# Patient Record
Sex: FEMALE | Race: AMERICAN INDIAN OR ALASKA NATIVE
[De-identification: names, ages, dates, MRNs, and addresses within clinical notes are randomized per-mention and may not be internally consistent; named-entity substitution may affect disease eponyms.]

---
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
## 2021-03-18 NOTE — EMERGENCY DEPARTMENT REPORT
ED General Adult HPI





- General


Chief complaint: Sore Throat


Stated complaint: SORETHROAT


Time Seen by Provider: 03/18/21 08:26


Source: patient


Mode of arrival: Ambulatory


Limitations: No Limitations





- History of Present Illness


Initial comments: 


This is a 20-year-old female with a severe sore throat for 4 days.  She is 

unable to tolerate p.o.  She has had no respiratory symptoms and no difficulty 

with phonation.  She does feel weak.  She is not giving an extensive history at 

this time.  However, she denies prior medical problems.





Patient presents with a temperature of 103.1 blood pressure 132/68 and a heart 

rate of 156 which is persistent at the time of my encounter.





-: days(s)


Location: mouth


Severity scale (0 -10): 5


Quality: aching


Consistency: constant


Improves with: none


Worsens with: none


Associated Symptoms: denies other symptoms (Poorly responsive to review but 

denies cough chest pain or respiratory symptoms.)





- Related Data


                                    Allergies











Allergy/AdvReac Type Severity Reaction Status Date / Time


 


No Known Allergies Allergy   Verified 03/18/21 08:03














ED Review of Systems


ROS: 


Stated complaint: SORETHROAT


Other details as noted in HPI





Constitutional: weakness.  denies: chills, fever


Eyes: denies: eye pain, eye discharge, vision change


ENT: throat pain.  denies: ear pain


Respiratory: denies: cough, shortness of breath, wheezing


Cardiovascular: denies: chest pain, palpitations


Endocrine: no symptoms reported


Gastrointestinal: denies: abdominal pain, nausea, diarrhea


Genitourinary: denies: urgency, dysuria, discharge


Musculoskeletal: denies: back pain, joint swelling, arthralgia


Skin: denies: rash, lesions


Neurological: denies: headache, weakness, paresthesias


Psychiatric: denies: anxiety, depression


Hematological/Lymphatic: denies: easy bleeding, easy bruising





ED Past Medical Hx





- Past Medical History


Previous Medical History?: No





- Surgical History


Past Surgical History?: No





- Social History


Smoking Status: Never Smoker


Substance Use Type: Alcohol





ED Physical Exam





- General


Limitations: Other (Poor cooperation)


General appearance: alert, in no apparent distress





- Head


Head exam: Present: atraumatic, normocephalic





- Eye


Eye exam: Present: normal appearance





- ENT


ENT exam: Present: mucous membranes moist, other (Exudative pharyngitis.  No 

evidence of uvular deviation or peritonsillar abscess)





- Neck


Neck exam: Present: normal inspection, lymphadenopathy, other (No neck swelling 

no fluctuant nodes there are bilateral submid mandibular nodes that are somewhat

tender but certainly not fluctuant)





- Respiratory


Respiratory exam: Present: normal lung sounds bilaterally.  Absent: respiratory 

distress





- Cardiovascular


Cardiovascular Exam: Present: normal rhythm, tachycardia.  Absent: systolic 

murmur, diastolic murmur, rubs, gallop





- GI/Abdominal


GI/Abdominal exam: Present: soft, normal bowel sounds.  Absent: distended, 

tenderness, guarding, rebound





- Extremities Exam


Extremities exam: Present: normal inspection





- Back Exam


Back exam: Present: normal inspection





- Neurological Exam


Neurological exam: Present: alert, oriented X3, CN II-XII intact.  Absent: motor

sensory deficit





- Psychiatric


Psychiatric exam: Present: normal affect, anxious





- Skin


Skin exam: Present: warm, dry, intact, normal color.  Absent: rash





ED Course


                                   Vital Signs











  03/18/21 03/18/21 03/18/21





  08:03 09:01 09:14


 


Temperature 103.1 F H  


 


Pulse Rate 156 H 120 H 


 


Respiratory 24 28 H 16





Rate   


 


Blood Pressure 132/68 125/75 


 


Blood Pressure   





[Left]   


 


O2 Sat by Pulse 100 100 





Oximetry   














  03/18/21 03/18/21 03/18/21





  09:15 09:16 09:31


 


Temperature   


 


Pulse Rate 129 H  111 H


 


Respiratory 16 18 19





Rate   


 


Blood Pressure 131/84  131/84


 


Blood Pressure   





[Left]   


 


O2 Sat by Pulse 98  99





Oximetry   














  03/18/21 03/18/21





  09:34 10:00


 


Temperature  100.3 F H


 


Pulse Rate  130 H


 


Respiratory 20 18





Rate  


 


Blood Pressure  


 


Blood Pressure  132/86





[Left]  


 


O2 Sat by Pulse 100 97





Oximetry  














- Reevaluation(s)


Reevaluation #1: 


Patient was given antipyretics and IV fluids.  Despite this her heart rate has 

remained quite elevated 120s to 160s.  She states she feels somewhat better.  I 

have given her clindamycin and decided to add ceftriaxone.  Cultures are pendin

g.  Strep screen is pending.  Lactic acid level was hemolyzed and will be 

redrawn.  The patient was found to have a positive pregnancy test.  She stated 

that her last period was on time and 2/17/2021.





Case was related to Dr. Herrera, hospitalist.  Consultation is pending.


03/18/21 10:42





Reevaluation #2: 


Discussed again with Dr. Paul.  She states to admit to Dr. Kocherla.


03/18/21 10:49








ED Medical Decision Making





- Lab Data


Result diagrams: 


                                 03/18/21 08:46





                                 03/18/21 08:46








                         Laboratory Results - last 24 hr











  03/18/21 03/18/21 03/18/21





  08:46 08:46 08:46


 


WBC  18.1 H  


 


RBC  4.18  


 


Hgb  13.0  


 


Hct  38.7  


 


MCV  93  


 


MCH  31  


 


MCHC  34  


 


RDW  12.9 L  


 


Plt Count  234  


 


Lymph % (Auto)  4.7 L  


 


Mono % (Auto)  7.6 H  


 


Eos % (Auto)  0.0  


 


Baso % (Auto)  0.4  


 


Lymph # (Auto)  0.8 L  


 


Mono # (Auto)  1.4 H  


 


Eos # (Auto)  0.0  


 


Baso # (Auto)  0.1  


 


Seg Neutrophils %  87.3 H  


 


Seg Neutrophils #  15.8 H  


 


PT   14.3 


 


INR   1.12 


 


APTT   31.7 


 


VBG pH   


 


Sodium   


 


Potassium   


 


Chloride   


 


Carbon Dioxide   


 


Anion Gap   


 


BUN   


 


Creatinine   


 


Estimated GFR   


 


BUN/Creatinine Ratio   


 


Glucose   


 


Calcium   


 


Magnesium   


 


Total Bilirubin   


 


Direct Bilirubin   


 


Indirect Bilirubin   


 


AST   


 


ALT   


 


Alkaline Phosphatase   


 


Total Protein   


 


Albumin   


 


Albumin/Globulin Ratio   


 


HCG, Qual    Positive


 


Monoscreen    Negative














  03/18/21 03/18/21 03/18/21





  08:46 08:46 08:46


 


WBC   


 


RBC   


 


Hgb   


 


Hct   


 


MCV   


 


MCH   


 


MCHC   


 


RDW   


 


Plt Count   


 


Lymph % (Auto)   


 


Mono % (Auto)   


 


Eos % (Auto)   


 


Baso % (Auto)   


 


Lymph # (Auto)   


 


Mono # (Auto)   


 


Eos # (Auto)   


 


Baso # (Auto)   


 


Seg Neutrophils %   


 


Seg Neutrophils #   


 


PT   


 


INR   


 


APTT   


 


VBG pH  7.437 H  


 


Sodium   136 L 


 


Potassium   3.5 L 


 


Chloride   100.5 


 


Carbon Dioxide   23 


 


Anion Gap   16 


 


BUN   7 


 


Creatinine   0.8 


 


Estimated GFR   > 60 


 


BUN/Creatinine Ratio   9 


 


Glucose   89 


 


Calcium   9.2 


 


Magnesium    1.90


 


Total Bilirubin   0.90 


 


Direct Bilirubin   0.2 


 


Indirect Bilirubin   0.7 


 


AST   13 


 


ALT   7 


 


Alkaline Phosphatase   58 


 


Total Protein   7.5 


 


Albumin   3.9 


 


Albumin/Globulin Ratio   1.1 


 


HCG, Qual   


 


Monoscreen   














- EKG Data


-: EKG Interpreted by Me


EKG shows normal: sinus rhythm, axis, intervals, QRS complexes, ST-T waves


Rate: normal, tachycardia





- EKG Data


Interpretation: no acute changes


Critical care attestation.: 


If time is entered above; I have spent that time in minutes in the direct care 

of this critically ill patient, excluding procedure time.








ED Disposition


Clinical Impression: 


 Exudative pharyngitis, Unable to eat, SIRS (systemic inflammatory response 

syndrome)





Pregnancy


Qualifiers:


 Weeks of gestation: less than 8 weeks Qualified Code(s): Z3A.01 - Less than 8 

weeks gestation of pregnancy





Disposition: DC-01 TO HOME OR SELFCARE


Is pt being admited?: Yes


Does the pt Need Aspirin: No


Condition: Stable


Referrals: 


[facility], MD [Primary Care Provider] - 3-5 Days


Time of Disposition: 10:57

## 2021-03-18 NOTE — CONSULTATION
History of Present Illness





- Reason for Consult


Consult date: 03/18/21





- History of Present Illness





20-year-old female no known past medical history presents complaining of sore 

throat.  She notes began 4 days previously and has been progressively worse over

that time.  She has been unable to tolerate p.o. food or drink.  She is a poor 

historian.  She is able to speak without issue however.





Febrile to 103.1 with a heart rate 156.  White count 18.1 strep and mono 

negative.  Beta hCG positive.  Blood cultures no growth to date.





Imaging personally reviewed:


None obtained so far.





Review of Systems: Bold if positive, otherwise negative


General: fevers, chills, rigors


HEENT: visual disturbance, diplopia, eye pain, +throat pain


Respiratory: cough, sputum, hemoptysis, shortness of breath


Cardiovascular: chest pain, syncope


Gastrointestinal: nausea, vomiting, diarrhea, abdominal pain


Genitourinary: dysuria, hematuria, flank pain


Musculoskeletal: neck pain, back pain, joint pain, edema 


Neurologic: headaches, seizures


Hematologic: easy bruising or bleeding


Endocrine: night sweats, acute weight loss


Skin: rash, jaundice, redness


Psychiatric: suicidal, homicidal ideation





Past History


Past Medical History: No medical history


Past Surgical History: No surgical history


Social history: no significant social history


Family history: no significant family history





Medications and Allergies


                                    Allergies











Allergy/AdvReac Type Severity Reaction Status Date / Time


 


No Known Allergies Allergy   Verified 03/18/21 08:03











Active Meds: 


Active Medications





Sodium Chloride (Nacl 0.9% 1000 Ml)  1,000 mls @ 75 mls/hr IV AS DIRECT SHARON











Physical Examination





- Physical Exam


Narrative exam: 





Physical Exam: 


Constitutional: Alert, cooperative. No acute distress


Head, Ears, Nose: Normocephalic, atraumatic. External ears, nose normal


Eyes: Conjunctivae/corneas clear. No icterus. No ptosis.


Neck: Supple, no meningeal signs


Oral: dentition fair, no thrush


Cardiovascular: S1, S2 normal. 


Respiratory: Good air entry, clear to auscultation bilaterally


GI: Soft, non-tender; bowel sounds normal. No peritoneal signs. 


Musculoskeletal: No pedal edema, no cyanosis.


Skin: No rash or abscess


Hem/Lymphatic: No palpable cervical or supraclavicular nodes. No lymphangitis


Psych: Mood ok. Affect normal


Neurological: Awake, alert, oriented. No gross abnormality





- Constitutional


Vitals: 


                                   Vital Signs











Temp Pulse Resp BP Pulse Ox


 


 100.3 F H  130 H  18   132/86   97 


 


 03/18/21 10:00  03/18/21 10:00  03/18/21 10:00  03/18/21 10:00  03/18/21 10:00








                           Temperature -Last 24 Hours











Temperature                    100.3 F


 


Temperature                    103.1 F

















Results





- Labs


CBC & Chem 7: 


                                 03/18/21 08:46





                                 03/18/21 08:46


Labs: 


                              Abnormal lab results











  03/18/21 03/18/21 03/18/21 Range/Units





  08:46 08:46 08:46 


 


WBC  18.1 H    (4.5-11.0)  K/mm3


 


RDW  12.9 L    (13.2-15.2)  %


 


Lymph % (Auto)  4.7 L    (13.4-35.0)  %


 


Mono % (Auto)  7.6 H    (0.0-7.3)  %


 


Lymph # (Auto)  0.8 L    (1.2-5.4)  K/mm3


 


Mono # (Auto)  1.4 H    (0.0-0.8)  K/mm3


 


Seg Neutrophils %  87.3 H    (40.0-70.0)  %


 


Seg Neutrophils #  15.8 H    (1.8-7.7)  K/mm3


 


VBG pH   7.437 H   (7.320-7.420)  


 


Sodium    136 L  (137-145)  mmol/L


 


Potassium    3.5 L  (3.6-5.0)  mmol/L














Assessment and Plan





Cultures:


Blood culture 3/18/2021 no growth so far








A/P: 20-year-old female no past medical history admitted with severe throat 

pain.





#Covid PUI: Can cause pharyngitis, recommend ruling out.





#Acute pharyngitis: Negative mono and strep.  Recommend ruling out COVID-19 and 

HIV.





#Pregnancy: Beta-hCG positive.  Select medications with care





Recs:


-Start clindamycin 450 mg every 8 hours


-Obtain HIV testing


-Obtain Covid testing


-Symptomatic treatment for sore throat including acetaminophen, ibuprofen





Thank you for the consult, we will continue to follow.





MOHAMUD Pugh MD


[facility] ([facility])


O: [phone]


F: [phone]

## 2021-03-18 NOTE — CONSULTATION
History of Present Illness


Consult date: 21


Requesting physician: AMY J KOCHERLA


Reason for consult: other (Positive pregnancy test)


History of present illness: 





There is a 20-year-old black female with last menstrual period was 2021 who

presented to the emergency room with severe pharyngitis and fever chills nausea 

and vomiting and generally not feeling well.  Patient had a positive pregnancy 

cyst noted on her work-up.  Patient was unaware.  Patient without any abnormal 

vaginal bleeding without any abdominal pain.  Patient states that her menses are

regular and she is expected her menses to start today.





Past History


Past Medical History: no pertinent history


Past Surgical History: no surgical history


GYN History: chlamydia





- Obstetrical History


: 1


Para: 0


Hx # Term Pregnancies: 0


Number of  Pregnancies: 0


Spontaneous Abortions: 0


Induced : 0


Number of Living Children: 0





Medications and Allergies


                                    Allergies











Allergy/AdvReac Type Severity Reaction Status Date / Time


 


No Known Allergies Allergy   Verified 21 08:03











Active Meds: 


Active Medications





Acetaminophen (Acetaminophen 325 Mg/10.15 Ml Oral Liqd Unit Dose)  650 mg PO 

PREOP NR


   Stop: 21 23:00


Sodium Chloride (Nacl 0.9% 1000 Ml)  1,000 mls @ 75 mls/hr IV AS DIRECT SHARON


Clindamycin HCl (Cleocin 600 Mg/50 Ml)  600 mg in 50 mls @ 100 mls/hr IV Q8H 

SHARON; Protocol


   Last Admin: 21 17:04 Dose:  100 mls/hr


   Documented by: 


Phenol (Phenol 1.4% 177 Ml Bottle)  1 spray MM PRN PRN


   PRN Reason: Sore Throat











Review of Systems


Constitutional: other (See H&P)





- Vital Signs


Vital signs: 


                                   Vital Signs











Temp Pulse Resp BP Pulse Ox


 


 103.1 F H  156 H  24   132/68   100 


 


 21 08:03  21 08:03  21 08:03  21 08:03  21 08:03








                                        











Temp Pulse Resp BP Pulse Ox


 


 99.2 F   116 H  16   114/88   96 


 


 21 17:30  21 18:31  21 18:31  21 18:31  21 18:31














- Physical Exam


Breasts: Positive: deferred


Cardiovascular: Regular rate


Abdomen: Positive: soft.  Negative: tenderness, guarding


Genitourinary (Female): Positive: other (Deferred)





Results


Result Diagrams: 


                                 21 08:46





                                 21 08:46


                              Abnormal lab results











  21 Range/Units





  08:46 08:46 08:46 


 


WBC  18.1 H    (4.5-11.0)  K/mm3


 


RDW  12.9 L    (13.2-15.2)  %


 


Lymph % (Auto)  4.7 L    (13.4-35.0)  %


 


Mono % (Auto)  7.6 H    (0.0-7.3)  %


 


Lymph # (Auto)  0.8 L    (1.2-5.4)  K/mm3


 


Mono # (Auto)  1.4 H    (0.0-0.8)  K/mm3


 


Seg Neutrophils %  87.3 H    (40.0-70.0)  %


 


Seg Neutrophils #  15.8 H    (1.8-7.7)  K/mm3


 


VBG pH   7.437 H   (7.320-7.420)  


 


Sodium    136 L  (137-145)  mmol/L


 


Potassium    3.5 L  (3.6-5.0)  mmol/L








All other labs normal.








Assessment and Plan





- Patient Problems


(1) Positive pregnancy test


Current Visit: Yes   Status: Acute   


Plan to address problem: 


Patient approximately 4 weeks pregnant without any abnormal bleeding or pelvic 

abdominal pain.  Patient just finding out today of the pregnancy.  Patient 

symptoms all appear to be secondary to a pharyngitis.  At this early gestational

age treatment should be concentrated on her pharyngitis.  The patient can 

follow-up after discharge to start her obstetrical care.  Please call if you 

have any questions or need any additional consultation.

## 2021-03-18 NOTE — ELECTROCARDIOGRAPH REPORT
[facility]

                                       

Test Date:    2021               Test Time:    09:56:11

Pat Name:     MITRA FLOYD       Department:   

Patient ID:   [id number]          Room:         A392

Gender:       F                        Technician:   [id number]

:          2000               Requested By: SAMSON BRADSHAW

Order Number: [id number]              Reading MD:   Salvador Macario

                                 Measurements

Intervals                              Axis          

Rate:         118                      P:            65

OR:           175                      QRS:          70

QRSD:         80                       T:            51

QT:           304                                    

QTc:          427                                    

                           Interpretive Statements

Sinus tachycardia

No previous ECG available for comparison

Electronically Signed On 3- 6:46:24 PDT by Salvador Macario

## 2021-03-18 NOTE — HISTORY AND PHYSICAL REPORT
History of Present Illness


Date of examination: 03/18/21


Date of admission: 


3/18/21


Chief complaint: 





Fever sore throat/


History of present illness: 


20-year-old female patient with no significant past medical history presented to

the emergency room with fever, not feeling well and sore throat of 3 to 4 days 

duration.  Patient reports her sore throat became worse since yesterday unable 

to take anything by mouth, denies nausea vomiting or abdominal pain


Denies chills and rigors, headache dizziness.  Patient is unable to speak due to

severe acute pharyngitis however able to communicate intermittently


Initial evaluation consistent with fever of 103 F, tachycardia and leukocytosis


Patient's pregnancy test is positive

















Past History


Past Medical History: No medical history


Past Surgical History: No surgical history


Social history: no significant social history


Family history: no significant family history





Medications and Allergies


                                    Allergies











Allergy/AdvReac Type Severity Reaction Status Date / Time


 


No Known Allergies Allergy   Verified 03/18/21 08:03














Review of Systems


Constitutional: fever, weakness


Ears, nose, mouth and throat: sore throat, no nasal congestion, no nasal 

discharge


Cardiovascular: no chest pain, no orthopnea, no palpitations


Respiratory: no cough with sputum, no shortness of breath


Gastrointestinal: no abdominal pain, no nausea, no vomiting


Genitourinary Female: no flank pain, no dysuria


Musculoskeletal: no myalgias, no arthritis


Integumentary: no rash, no lesions


Neurological: no seizures, no syncope


Psychiatric: no anxiety, no depression


Endocrine: no cold intolerance, no heat intolerance


Hematologic/Lymphatic: no easy bruising, no easy bleeding


Allergic/Immunologic: no urticaria, no allergic rhinitis





Exam





- Constitutional


Vitals: 


                                        











Temp Pulse Resp BP Pulse Ox


 


 100.3 F H  130 H  18   132/86   97 


 


 03/18/21 10:00  03/18/21 10:00  03/18/21 10:00  03/18/21 10:00  03/18/21 10:00











General appearance: Present: mild distress, well-nourished, other (Febrile)





- EENT


Eyes: Present: PERRL, EOM intact


ENT: oropharyngeal erythema, other (Sore throat)





- Neck


Neck: Present: supple, normal ROM





- Respiratory


Respiratory effort: normal


Respiratory: bilateral: diminished, rhonchi, negative: rales, wheezing





- Cardiovascular


Rhythm: regular


Heart Sounds: Present: S1 & S2





- Extremities


Extremities: no ischemia, No edema





- Abdominal


General gastrointestinal: Present: soft, non-tender, non-distended, normal bowel

sounds





- Integumentary


Integumentary: Present: clear, warm





- Musculoskeletal


Musculoskeletal: strength equal bilaterally, generalized weakness





- Psychiatric


Psychiatric: appropriate mood/affect, cooperative





- Neurologic


Neurologic: moves all extremities





Results





- Labs


CBC & Chem 7: 


                                 03/18/21 08:46





                                 03/18/21 08:46


Labs: 


                              Abnormal lab results











  03/18/21 03/18/21 03/18/21 Range/Units





  08:46 08:46 08:46 


 


WBC  18.1 H    (4.5-11.0)  K/mm3


 


RDW  12.9 L    (13.2-15.2)  %


 


Lymph % (Auto)  4.7 L    (13.4-35.0)  %


 


Mono % (Auto)  7.6 H    (0.0-7.3)  %


 


Lymph # (Auto)  0.8 L    (1.2-5.4)  K/mm3


 


Mono # (Auto)  1.4 H    (0.0-0.8)  K/mm3


 


Seg Neutrophils %  87.3 H    (40.0-70.0)  %


 


Seg Neutrophils #  15.8 H    (1.8-7.7)  K/mm3


 


VBG pH   7.437 H   (7.320-7.420)  


 


Sodium    136 L  (137-145)  mmol/L


 


Potassium    3.5 L  (3.6-5.0)  mmol/L














Assessment and Plan


--Febrile illness;


T-max 103 degrees fall in height


Secondary to acute pharyngitis


Antipyretics, antibiotics cultures,


Strep throat negative


ID consulted





--Acute pharyngitis;


Mono and strep throat test negative


Supportive care with throat gargles


IV clindamycin, follow cultures


Follow ID evaluation recommendations





--PUI; isolation precautions


Corona PCR test requested


Closely monitor





--Leukocytosis; secondary to acute pharyngitis





--Mild hyponatremia; continue normal saline


Closely monitor electrolytes





--Positive beta-hCG/early pregnancy;


Will consult OB GYN for recommendations





--DVT prophylaxis; SCDs





We will closely monitor the patient and adjust management as needed


Plan of care reviewed with the patient and her nurse

## 2021-03-19 NOTE — DISCHARGE SUMMARY
Providers





- Providers


Date of Admission: 


03/18/21 10:58





Date of discharge: 03/19/21


Attending physician: 


ELENA HENSLEY





                                        





03/18/21 10:36


Consult to Physician [CONS] Urgent 


   Comment: 


   Consulting Provider: OSMIN REEVES


   Physician Instructions: 


   Reason For Exam: SIRS Syndrome Pharyngitis





03/18/21 10:44


Consult to Physician [CONS] Routine 


   Comment: 


   Consulting Provider: TOREY ROBERTS


   Physician Instructions: 


   Reason For Exam: Pharyngitis with sepsis





03/18/21 13:10


Consult to Physician [CONS] Routine 


   Comment: 


   Consulting Provider: UMANG DIXON


   Physician Instructions: 


   Reason For Exam: 4 weeks pregnancy/fever/sepsis acute pharyngitis/











Primary care physician: 


[facility], MD








Hospitalization


Condition: Stable


Hospital course: 


History of present illness: 


20-year-old female patient with no significant past medical history presented to

 the emergency room with fever, not feeling well and sore throat of 3 to 4 days 

duration.  Patient reports her sore throat became worse since yesterday unable 

to take anything by mouth, denies nausea vomiting or abdominal pain


Denies chills and rigors, headache dizziness.  Patient is unable to speak due to

 severe acute pharyngitis however able to communicate intermittently


Initial evaluation consistent with fever of 103 F, tachycardia and leukocytosis


Patient's pregnancy test is positive


Covid test is negative








Assessment and Plan





SIRS


--Febrile illness;


T-max 103 degrees fall in height


Secondary to acute pharyngitis


Antipyretics, antibiotics cultures,


Strep throat negative


Discharge on Augmentin 875 mg po bid





--Acute pharyngitis;


Mono and strep throat test negative


Supportive care with throat gargles


IV Ceftriaxone and discharge


Follow ID evaluation recommendations





--PUI; isolation precautions


Corona PCR test Negative





--Leukocytosis; secondary to acute pharyngitis





--Mild hyponatremia; continue normal saline


Closely monitor electrolytes





--Positive beta-hCG/early pregnancy;


Will consult OB GYN for recommendations











Disposition: DC-01 TO HOME OR SELFCARE


Final Discharge Diagnosis (Prints w/discharge instructions): Strep pharyngitis





- Discharge Diagnoses


(1) SIRS (systemic inflammatory response syndrome)


Status: Acute   





(2) Exudative pharyngitis


Status: Acute   Comment: Discharge on oral Augmentin 875 bid x 8 days   





(3) Pregnancy


Status: Acute   


Qualifiers: 


   Weeks of gestation: less than 8 weeks   Qualified Code(s): Z3A.01 - Less than

 8 weeks gestation of pregnancy   





Core Measure Documentation





- Palliative Care


Palliative Care/ Comfort Measures: Not Applicable





- Core Measures


Any of the following diagnoses?: none





Exam





- Constitutional


Vitals: 


                                        











Temp Pulse Resp BP Pulse Ox


 


 99.8 F H  114 H  22   119/76   98 


 


 03/18/21 21:43  03/18/21 21:43  03/18/21 21:43  03/18/21 21:43  03/18/21 21:43











General appearance: Present: no acute distress, well-nourished





- EENT


Eyes: Present: PERRL


ENT: hearing intact, clear oral mucosa, other (Posterior pharynx exudate)





- Neck


Neck: Present: supple, normal ROM





- Respiratory


Respiratory effort: normal


Respiratory: bilateral: CTA





- Cardiovascular


Heart rate: 78


Rhythm: regular


Heart Sounds: Present: S1 & S2.  Absent: rub, click





- Extremities


Extremities: pulses symmetrical, No edema


Peripheral Pulses: within normal limits





- Abdominal


General gastrointestinal: Present: soft, non-tender, non-distended, normal bowel

 sounds


Female genitourinary: Present: normal





- Integumentary


Integumentary: Present: clear, warm, dry





- Musculoskeletal


Musculoskeletal: gait normal, strength equal bilaterally





- Psychiatric


Psychiatric: appropriate mood/affect, intact judgment & insight





- Neurologic


Neurologic: CNII-XII intact, moves all extremities





Plan


Activity: no restrictions


Diet: regular


[facility] 


[facility] - 3-5 Days


UMANG DIXON MD [Staff Physician] - 7 Days


MAXIMILIAN HERRERA MD [Staff Physician] - 7 Days

## 2021-03-19 NOTE — PROGRESS NOTE
Assessment and Plan





Cultures:


Blood culture 3/18/2021 no growth so far





A/P: 20-year-old female no past medical history admitted with severe throat 

pain.





#Covid PUI: Can cause pharyngitis, recommend ruling out.





#Acute pharyngitis: Negative mono and strep.  Recommend ruling out COVID-19. HIV

negative. 





#Pregnancy: Beta-hCG positive.  Select medications with care





Recs:


-Start clindamycin 600 mg every 8 hours, complete 5 days. 


-Follow up Covid testing


-Symptomatic treatment for sore throat including acetaminophen, ibuprofen





Thank you for the consult, we will continue to follow. Dr. Timmons taking over 

tomorrow. 





MOHAMUD Pugh MD


[facility] ([facility]


O: [phone]


F: [phone]





Subjective


Date of service: 03/19/21


Interval history: 





Patient fevers appear to be resolving yesterday, though no new labs available 

today and no new vitals done. 





Objective





- Exam


Narrative Exam: 





Physical exam deferred due to PPE conservation strategy.  Please refer to 

primary team's note.





- Constitutional


Vitals: 


                                   Vital Signs











Temp Pulse Resp BP Pulse Ox


 


 99.8 F H  114 H  22   119/76   98 


 


 03/18/21 21:43  03/18/21 21:43  03/18/21 21:43  03/18/21 21:43  03/18/21 21:43








                           Temperature -Last 24 Hours











Temperature                    99.8 F


 


Temperature                    99.2 F

















- Labs


CBC & Chem 7: 


                                 03/18/21 08:46





                                 03/18/21 08:46

## 2021-03-31 NOTE — ULTRASOUND REPORT
US OB <= 14 weeks fetus



INDICATION / CLINICAL INFORMATION:

Vaginal bleeding.



COMPARISON:

None available.



FINDINGS:

Uterus appears normal. Endometrial stripe measures 4 mm in thickness. There is no evidence of intraut
erine pregnancy.



Both ovaries are unremarkable. No free fluid.



IMPRESSION:

1. Normal study of the pelvis. No intrauterine pregnancy. 



Signer Name: Navdeep Graves MD 

Signed: 3/31/2021 7:23 AM

Workstation Name: [id number]

## 2021-03-31 NOTE — EMERGENCY DEPARTMENT REPORT
ED Female  HPI





- General


Chief complaint: Vaginal Bleeding


Stated complaint: 6 WEEKS PREGNANT;VAGINAL BLEEDING


Time Seen by Provider: 21 10:05


Source: patient


Mode of arrival: Ambulatory


Limitations: No Limitations





- History of Present Illness


Initial comments: 





Patient is a 20-year-old -American female that comes to the emergency 

room with complaints that she is pregnant and having vaginal bleeding.  It 

started yesterday.  She states that this point is like a regular menses.  

Patient denies any pain per se just mild cramping.  She denies any vaginal 

discharge.  Denies any dysuria.  This is patient's first pregnancy.





Patient is ambulatory nontoxic and non-ill-appearing on exam.





Patient is ambulatory and taking p.o.


MD Complaint: vaginal bleeding


-: Gradual, days(s)


Severity: mild


Quality: cramping


Improves with: none


Worsens with: none


Are you Pregnant Now?: Yes


Associated Symptoms: denies other symptoms, vaginal discharge





- Related Data


                                  Previous Rx's











 Medication  Instructions  Recorded  Last Taken  Type


 


Amoxicillin/Potassium Clav 1 each PO BID #16 tablet 21 Unknown Rx





[Augmentin 875-125 Tablet]    


 


Phenol 1.4% [Chloraseptic] 1 spray MM PRN PRN #1 bottle 21 Unknown Rx











                                    Allergies











Allergy/AdvReac Type Severity Reaction Status Date / Time


 


No Known Allergies Allergy   Verified 21 08:03














ED Review of Systems


ROS: 


Stated complaint: 6 WEEKS PREGNANT;VAGINAL BLEEDING


Other details as noted in HPI





Comment: All other systems reviewed and negative





ED Past Medical Hx





- Past Medical History


Previous Medical History?: No


Hx Hypertension: No


Hx Heart Attack/AMI: No


Hx Congestive Heart Failure: No


Hx Diabetes: No


Hx Deep Vein Thrombosis: No


Hx Pulmonary Embolism: No


Hx GERD: No


Hx Liver Disease: No


Hx Renal Disease: No


Hx Arthritis: No


Hx Seizures: No


Hx Kidney Stones: No


Hx Asthma: No


Hx COPD: No


Hx Tuberculosis: No


Hx Dementia: No


Hx HIV: No





- Surgical History


Past Surgical History?: No


Hx Coronary Stent: No


Hx Pacemaker: No


Hx Internal Defibrillator: No





- Family History


Family history: no significant





- Social History


Smoking Status: Former Smoker


Substance Use Type: None





- Medications


Home Medications: 


                                Home Medications











 Medication  Instructions  Recorded  Confirmed  Last Taken  Type


 


Amoxicillin/Potassium Clav 1 each PO BID #16 tablet 21  Unknown Rx





[Augmentin 875-125 Tablet]     


 


Phenol 1.4% [Chloraseptic] 1 spray MM PRN PRN #1 bottle 21  Unknown Rx














ED Physical Exam





- General


Limitations: No Limitations


General appearance: alert, in no apparent distress





- Head


Head exam: Present: atraumatic, normocephalic





- Eye


Eye exam: Present: normal appearance





- ENT


ENT exam: Present: mucous membranes moist





- Neck


Neck exam: Present: normal inspection





- Respiratory


Respiratory exam: Present: normal lung sounds bilaterally.  Absent: respiratory 

distress





- Cardiovascular


Cardiovascular Exam: Present: regular rate, normal rhythm.  Absent: systolic 

murmur, diastolic murmur, rubs, gallop





- GI/Abdominal


GI/Abdominal exam: Present: soft, normal bowel sounds





- Extremities Exam


Extremities exam: Present: normal inspection





- Back Exam


Back exam: Present: normal inspection





- Neurological Exam


Neurological exam: Present: alert, oriented X3





- Psychiatric


Psychiatric exam: Present: normal affect, normal mood





- Skin


Skin exam: Present: warm, dry, intact, normal color.  Absent: rash





ED Course


                                   Vital Signs











  21





  06:30


 


Temperature 98.1 F


 


Pulse Rate 89


 


Respiratory 17





Rate 


 


Blood Pressure 140/91


 


O2 Sat by Pulse 93





Oximetry 














ED Medical Decision Making





- Lab Data


Result diagrams: 


                                 21 06:47





                                 21 06:47





- Radiology Data


Radiology results: report reviewed, image reviewed





- Medical Decision Making








                                   Lab Results











  21 Range/Units





  06:47 06:47 06:47 


 


WBC  8.9    (4.5-11.0)  K/mm3


 


RBC  4.20    (3.65-5.03)  M/mm3


 


Hgb  12.9    (10.1-14.3)  gm/dl


 


Hct  39.3    (30.3-42.9)  %


 


MCV  94    (79-97)  fl


 


MCH  31    (28-32)  pg


 


MCHC  33    (30-34)  %


 


RDW  13.5    (13.2-15.2)  %


 


Plt Count  453 H    (140-440)  K/mm3


 


Lymph % (Auto)  17.7    (13.4-35.0)  %


 


Mono % (Auto)  7.6 H    (0.0-7.3)  %


 


Eos % (Auto)  0.8    (0.0-4.3)  %


 


Baso % (Auto)  0.4    (0.0-1.8)  %


 


Lymph # (Auto)  1.6    (1.2-5.4)  K/mm3


 


Mono # (Auto)  0.7    (0.0-0.8)  K/mm3


 


Eos # (Auto)  0.1    (0.0-0.4)  K/mm3


 


Baso # (Auto)  0.0    (0.0-0.1)  K/mm3


 


Seg Neutrophils %  73.5 H    (40.0-70.0)  %


 


Seg Neutrophils #  6.6    (1.8-7.7)  K/mm3


 


PT   12.1 L   (12.2-14.9)  Sec.


 


INR   0.91   (0.87-1.13)  


 


Sodium    139  (137-145)  mmol/L


 


Potassium    4.1  (3.6-5.0)  mmol/L


 


Chloride    102.8  ()  mmol/L


 


Carbon Dioxide    26  (22-30)  mmol/L


 


Anion Gap    14  mmol/L


 


BUN    13  (7-17)  mg/dL


 


Creatinine    0.7  (0.6-1.2)  mg/dL


 


Estimated GFR    > 60  ml/min


 


BUN/Creatinine Ratio    19  %


 


Glucose    95  ()  mg/dL


 


Calcium    9.0  (8.4-10.2)  mg/dL


 


HCG, Quant     (0-4)  mIU/mL


 


Urine Color     (Yellow)  


 


Urine Turbidity     (Clear)  


 


Urine pH     (5.0-7.0)  


 


Ur Specific Gravity     (1.003-1.030)  


 


Urine Protein     (Negative)  mg/dL


 


Urine Glucose (UA)     (Negative)  mg/dL


 


Urine Ketones     (Negative)  mg/dL


 


Urine Blood     (Negative)  


 


Urine Nitrite     (Negative)  


 


Urine Bilirubin     (Negative)  


 


Urine Urobilinogen     (<2.0)  mg/dL


 


Ur Leukocyte Esterase     (Negative)  


 


Urine WBC (Auto)     (0.0-6.0)  /HPF


 


Urine RBC (Auto)     (0.0-6.0)  /HPF


 


U Epithel Cells (Auto)     (0-13.0)  /HPF


 


Urine Mucus     /HPF


 


Blood Type     


 


Ord Rhogam Gestat Weeks     WEEKS














  21 Range/Units





  06:47 06:47 06:48 


 


WBC     (4.5-11.0)  K/mm3


 


RBC     (3.65-5.03)  M/mm3


 


Hgb     (10.1-14.3)  gm/dl


 


Hct     (30.3-42.9)  %


 


MCV     (79-97)  fl


 


MCH     (28-32)  pg


 


MCHC     (30-34)  %


 


RDW     (13.2-15.2)  %


 


Plt Count     (140-440)  K/mm3


 


Lymph % (Auto)     (13.4-35.0)  %


 


Mono % (Auto)     (0.0-7.3)  %


 


Eos % (Auto)     (0.0-4.3)  %


 


Baso % (Auto)     (0.0-1.8)  %


 


Lymph # (Auto)     (1.2-5.4)  K/mm3


 


Mono # (Auto)     (0.0-0.8)  K/mm3


 


Eos # (Auto)     (0.0-0.4)  K/mm3


 


Baso # (Auto)     (0.0-0.1)  K/mm3


 


Seg Neutrophils %     (40.0-70.0)  %


 


Seg Neutrophils #     (1.8-7.7)  K/mm3


 


PT     (12.2-14.9)  Sec.


 


INR     (0.87-1.13)  


 


Sodium     (137-145)  mmol/L


 


Potassium     (3.6-5.0)  mmol/L


 


Chloride     ()  mmol/L


 


Carbon Dioxide     (22-30)  mmol/L


 


Anion Gap     mmol/L


 


BUN     (7-17)  mg/dL


 


Creatinine     (0.6-1.2)  mg/dL


 


Estimated GFR     ml/min


 


BUN/Creatinine Ratio     %


 


Glucose     ()  mg/dL


 


Calcium     (8.4-10.2)  mg/dL


 


HCG, Quant  31.73 H    (0-4)  mIU/mL


 


Urine Color    Yellow  (Yellow)  


 


Urine Turbidity    Clear  (Clear)  


 


Urine pH    5.0  (5.0-7.0)  


 


Ur Specific Gravity    1.026  (1.003-1.030)  


 


Urine Protein    <15 mg/dl  (Negative)  mg/dL


 


Urine Glucose (UA)    Neg  (Negative)  mg/dL


 


Urine Ketones    Neg  (Negative)  mg/dL


 


Urine Blood    Mod  (Negative)  


 


Urine Nitrite    Neg  (Negative)  


 


Urine Bilirubin    Neg  (Negative)  


 


Urine Urobilinogen    < 2.0  (<2.0)  mg/dL


 


Ur Leukocyte Esterase    Neg  (Negative)  


 


Urine WBC (Auto)    2.0  (0.0-6.0)  /HPF


 


Urine RBC (Auto)    10.0  (0.0-6.0)  /HPF


 


U Epithel Cells (Auto)    5.0  (0-13.0)  /HPF


 


Urine Mucus    1+  /HPF


 


Blood Type   B POSITIVE   


 


Ord Rhogam Gestat Weeks   Rh pos   WEEKS











                                   Vital Signs











  21





  06:30


 


Temperature 98.1 F


 


Pulse Rate 89


 


Respiratory 17





Rate 


 


Blood Pressure 140/91


 


O2 Sat by Pulse 93





Oximetry 








rh pos





labs noted





ua noted





us noted





G1





lmp [id number]





Patient being discharged home with discharge plan of care including pelvic rest.

  Tylenol for pain.  And OB/GYN follow-up in 48 hours for repeat lab and 

imaging.  Patient verbalizes understanding of discharge plan of care.











- Differential Diagnosis


ro ab


Critical care attestation.: 


If time is entered above; I have spent that time in minutes in the direct care 

of this critically ill patient, excluding procedure time.








ED Disposition


Clinical Impression: 


 Threatened 





Disposition: DC-01 TO HOME OR SELFCARE


Is pt being admited?: No


Does the pt Need Aspirin: No


Condition: Stable


Instructions:  Threatened Miscarriage


Additional Instructions: 


pelvic rest


no sex





motrin or tylenol for pain





follow up with obgyn in 48 hours for recheck below





referral below








Referrals: 


PRIMARY CARE,MD [Primary Care Provider] - 3-5 Days


FLOR CORDERO MD [Staff Physician] - 3-5 Days


Time of Disposition: 10:07

## 2021-04-01 NOTE — EMERGENCY DEPARTMENT REPORT
Chief Complaint: Recheck/Abnormal Lab/Rx


Stated Complaint: RECHECK LAB WORK


Time Seen by Provider: 04/01/21 08:22





- HPI


History of Present Illness: 





here yesterday for threatened AB


instructed to go for follow up in 48 hours


it has been less than 24





no change in her complaints





explained to pt it is too early to have blood checked





- ROS


Review of Systems: 





vag spotting in preg





- Exam


Vital Signs: 


                                   Vital Signs











  04/01/21





  06:33


 


Temperature 97.6 F


 


Pulse Rate 91 H


 


Respiratory 16





Rate 


 


Blood Pressure 119/67


 


O2 Sat by Pulse 100





Oximetry 











Physical Exam: 





a/o


abd snt


ambulatory and non ill appearing 





vs normal 


MSE screening note: 


Focused history and physical exam performed.


Due to findings the following was ordered:





no life threat 





Will see ob tomorrow as instructed yesterday 


Patient discussed with doctor:: MELISSA MORFIN





ED Disposition for MSE


Clinical Impression: 


 Pregnancy





Disposition: Z-07 MED SCREENING EXAM-LEFT


Is pt being admited?: No


Does the pt Need Aspirin: No


Condition: Stable


Referrals: 


PRIMARY CARE,MD [Primary Care Provider] - 3-5 Days


Time of Disposition: 08:25

## 2021-04-05 NOTE — EMERGENCY DEPARTMENT REPORT
<SABI OLIVERA - Last Filed: 21 12:45>





ED Female  HPI





- General


Chief complaint: Recheck/Abnormal Lab/Rx


Stated complaint: RECHECK


Time Seen by Provider: 21 11:30


Source: patient


Mode of arrival: Ambulatory


Limitations: No Limitations





- History of Present Illness


Initial comments: 


20 yr old female presents to ED for recheck of her Quant HCG. Patient found out 

she was pregnant 2021 when she came in with c/o sore throat and came her 

for eval. It was incidental finding, as she was not aware she was pregnant. 

Patient returned to ED on 21 as she was having vag bleeding. Her quant HCG

at the time was 31.73, she was dx with threatened miscarriage and was given 

referral to Dr Bonilla's office for f/u. She states she tried to f/u with the

office but they told her they could not see her today and so she came here. She 

states she is no longer bleeding or have any symptoms at this time. She is . 


MD Complaint: other (recheck Quant HCG)


-: days(s)





- Related Data


                                  Previous Rx's











 Medication  Instructions  Recorded  Last Taken  Type


 


Amoxicillin/Potassium Clav 1 each PO BID #16 tablet 21 Unknown Rx





[Augmentin 875-125 Tablet]    


 


Phenol 1.4% [Chloraseptic] 1 spray MM PRN PRN #1 bottle 21 Unknown Rx











                                    Allergies











Allergy/AdvReac Type Severity Reaction Status Date / Time


 


No Known Allergies Allergy   Verified 21 11:22














ED Review of Systems


Comment: All other systems reviewed and negative


Constitutional: denies: chills, fever


ENT: denies: ear pain, throat pain, dental pain, hearing loss


Respiratory: denies: cough, shortness of breath, wheezing


Cardiovascular: denies: chest pain, palpitations


Gastrointestinal: denies: abdominal pain, nausea, diarrhea, constipation, 

melena, hematochezia


Genitourinary: denies: urgency, dysuria, frequency, hematuria, discharge, 

abnormal menses, dyspareunia


Musculoskeletal: denies: back pain, joint swelling, arthralgia


Skin: denies: rash, lesions, change in color, change in hair/nails, pruritus


Neurological: denies: headache, weakness, paresthesias, abnormal gait, vertigo


Psychiatric: denies: anxiety, depression, auditory hallucinations, visual 

hallucinations, homicidal thoughts, suicidal thoughts


Hematological/Lymphatic: denies: easy bleeding, easy bruising





ED Past Medical Hx





- Past Medical History


Hx Hypertension: No


Hx Heart Attack/AMI: No


Hx Congestive Heart Failure: No


Hx Diabetes: No


Hx Deep Vein Thrombosis: No


Hx Pulmonary Embolism: No


Hx GERD: No


Hx Liver Disease: No


Hx Renal Disease: No


Hx Arthritis: No


Hx Seizures: No


Hx Kidney Stones: No


Hx Asthma: No


Hx COPD: No


Hx Tuberculosis: No


Hx Dementia: No


Hx HIV: No





- Surgical History


Hx Coronary Stent: No


Hx Pacemaker: No


Hx Internal Defibrillator: No





- Social History


Smoking Status: Never Smoker


Substance Use Type: None





- Medications


Home Medications: 


                                Home Medications











 Medication  Instructions  Recorded  Confirmed  Last Taken  Type


 


Amoxicillin/Potassium Clav 1 each PO BID #16 tablet 21  Unknown Rx





[Augmentin 875-125 Tablet]     


 


Phenol 1.4% [Chloraseptic] 1 spray MM PRN PRN #1 bottle 21  Unknown Rx














ED Physical Exam





- General


Limitations: No Limitations


General appearance: alert, in no apparent distress





- Head


Head exam: Present: atraumatic, normocephalic, normal inspection





- Eye


Eye exam: Present: normal appearance, PERRL, EOMI


Pupils: Present: normal accommodation





- ENT


ENT exam: Present: normal exam, mucous membranes moist





- Neck


Neck exam: Present: normal inspection, full ROM





- Respiratory


Respiratory exam: Present: normal lung sounds bilaterally.  Absent: respiratory 

distress





- Cardiovascular


Cardiovascular Exam: Present: regular rate, normal rhythm, normal heart sounds





- GI/Abdominal


GI/Abdominal exam: Present: soft.  Absent: distended, tenderness, guarding, 

rebound, rigid





- Neurological Exam


Neurological exam: Present: alert, oriented X3, CN II-XII intact, normal gait





- Psychiatric


Psychiatric exam: Present: normal affect, normal mood





- Skin


Skin exam: Present: intact





ED Medical Decision Making





- Medical Decision Making


Patient quant hCG went from 31.73-2.93 today.  Patient likely had a miscarriage.

  Patient again denies any symptoms currently.  She is not toxic, she is well-

appearing, and is not in any acute distress.  Her vital signs are stable.  No 

indication for further work-up, or emergent consult at this time.  Discussed lab

 results and diagnosis with patient.  She expressed understanding of 

instructions and agree with plan.  Patient stable at time of discharge








ED Disposition


Clinical Impression: 


 Negative pregnancy test





Disposition: DC-01 TO HOME OR SELFCARE


Is pt being admited?: No


Does the pt Need Aspirin: No


Condition: Stable


Instructions:  Miscarriage, Easy-to-Read


Additional Instructions: 


You likely had a miscarriage based on your lab results. I still recommend Follow

 up with OBGYN next week. Return to ED if symptoms changes or worsens.  


Referrals: 


[facility] [Provider Group] - 7-10 days


[facility] [Provider Group] - 7-10 days


Time of Disposition: 12:38





<TYRONE BLEDSOE - Last Filed: 21 18:56>





ED Review of Systems


ROS: 


Stated complaint: RECHECK


Other details as noted in HPI








ED Course





                                   Vital Signs











  21





  11:22


 


Temperature 98 F


 


Pulse Rate 90


 


Respiratory 20





Rate 


 


Blood Pressure 133/100


 


O2 Sat by Pulse 100





Oximetry 











Critical care attestation.: 


If time is entered above; I have spent that time in minutes in the direct care 

of this critically ill patient, excluding procedure time.








ED Disposition


Is pt being admited?: No


Does the pt Need Aspirin: No